# Patient Record
Sex: FEMALE | URBAN - METROPOLITAN AREA
[De-identification: names, ages, dates, MRNs, and addresses within clinical notes are randomized per-mention and may not be internally consistent; named-entity substitution may affect disease eponyms.]

---

## 2021-03-10 ENCOUNTER — OFFICE VISIT (OUTPATIENT)
Dept: URBAN - METROPOLITAN AREA CLINIC 98 | Facility: CLINIC | Age: 51
End: 2021-03-10
Payer: COMMERCIAL

## 2021-03-10 ENCOUNTER — DASHBOARD ENCOUNTERS (OUTPATIENT)
Age: 51
End: 2021-03-10

## 2021-03-10 VITALS
BODY MASS INDEX: 24.8 KG/M2 | SYSTOLIC BLOOD PRESSURE: 107 MMHG | HEIGHT: 62 IN | TEMPERATURE: 96.1 F | DIASTOLIC BLOOD PRESSURE: 75 MMHG | HEART RATE: 81 BPM | WEIGHT: 134.8 LBS

## 2021-03-10 DIAGNOSIS — K58.1 IRRITABLE BOWEL SYNDROME WITH CONSTIPATION: ICD-10-CM

## 2021-03-10 DIAGNOSIS — K59.01 CONSTIPATION BY DELAYED COLONIC TRANSIT: ICD-10-CM

## 2021-03-10 DIAGNOSIS — R14.0 BLOATING: ICD-10-CM

## 2021-03-10 PROBLEM — 440630006: Status: ACTIVE | Noted: 2021-03-10

## 2021-03-10 PROCEDURE — 99204 OFFICE O/P NEW MOD 45 MIN: CPT | Performed by: INTERNAL MEDICINE

## 2021-03-10 NOTE — HPI-OTHER HISTORIES
takes senna daily if no lax, no BM very bloated life long constipation no blood in stool s/p cholecystectomy s/p hysterectomy

## 2021-03-14 LAB
A/G RATIO: 1.7
ALBUMIN: 4.7
ALKALINE PHOSPHATASE: 45
ALT (SGPT): 18
AST (SGOT): 25
BILIRUBIN, TOTAL: 0.4
BUN/CREATININE RATIO: 30
BUN: 23
CALCIUM: 9.9
CARBON DIOXIDE, TOTAL: 26
CHLORIDE: 99
CREATININE: 0.77
DEAMIDATED GLIADIN ABS, IGA: 3
DEAMIDATED GLIADIN ABS, IGG: 2
EGFR IF AFRICN AM: 105
EGFR IF NONAFRICN AM: 91
ENDOMYSIAL ANTIBODY IGA: NEGATIVE
GLOBULIN, TOTAL: 2.8
GLUCOSE: 80
HEMATOCRIT: 44.5
HEMOGLOBIN: 14.3
IMMUNOGLOBULIN A, QN, SERUM: 176
MCH: 28
MCHC: 32.1
MCV: 87
NRBC: (no result)
PLATELETS: 312
POTASSIUM: 4.7
PROTEIN, TOTAL: 7.5
RBC: 5.11
RDW: 13.8
SODIUM: 139
T-TRANSGLUTAMINASE (TTG) IGA: <2
T-TRANSGLUTAMINASE (TTG) IGG: <2
WBC: 6.8

## 2021-03-17 ENCOUNTER — OFFICE VISIT (OUTPATIENT)
Dept: URBAN - METROPOLITAN AREA TELEHEALTH 2 | Facility: TELEHEALTH | Age: 51
End: 2021-03-17
Payer: COMMERCIAL

## 2021-03-17 DIAGNOSIS — K58.1 IRRITABLE BOWEL SYNDROME WITH CONSTIPATION: ICD-10-CM

## 2021-03-17 DIAGNOSIS — K59.01 CONSTIPATION BY DELAYED COLONIC TRANSIT: ICD-10-CM

## 2021-03-17 PROCEDURE — 97802 MEDICAL NUTRITION INDIV IN: CPT | Performed by: DIETITIAN, REGISTERED

## 2021-03-17 NOTE — HPI-TODAY'S VISIT:
Nutrition Visit: Gallbladder removed 22 years ago.  Ht: 62"  wt: 128lbs Works out 6 days per week.  3-4 miles 2-3 times per week.   Lift 6 days per day week.  2 hours at workouts.  Drinks at least 100 ounces water per day.  80 ounces before 10am.  Constipation issue in past 7-8 years.  Treats with senna, etc.  Pt has workout body building  for past 7-8 years.  140g cho, 37g fat 165 grams pro.  Lean body uday salted carmael protein shake,  orgain 2 scoops, collagen 1/2 cup bluebverries 16-20 almonds.  80-85 grams spinach or kale.  Wakes up 5:30-6am  24 ounces water.  Orgain protein shake with water.  Goes to gym.  JHome by 10-10:30.  11am  bone broth lean body shake, another orgain protein shake 1 cup almond milk.  Lunch 4.5 oz lean meat or salmon.  4-5oz kale with berries.  last meal 4.5 oz lean protein 4oz william rice.  maybe another protein shake.  Typical day. Sometimes ritz crackers or cupcakes, etc.  Weekends a lot different.

## 2021-03-22 PROBLEM — 35298007: Status: ACTIVE | Noted: 2021-03-10

## 2021-03-24 ENCOUNTER — OFFICE VISIT (OUTPATIENT)
Dept: URBAN - METROPOLITAN AREA SURGERY CENTER 18 | Facility: SURGERY CENTER | Age: 51
End: 2021-03-24
Payer: COMMERCIAL

## 2021-03-24 DIAGNOSIS — K59.09 CHRONIC CONSTIPATION: ICD-10-CM

## 2021-03-24 PROCEDURE — 45378 DIAGNOSTIC COLONOSCOPY: CPT | Performed by: INTERNAL MEDICINE

## 2021-03-24 PROCEDURE — G8907 PT DOC NO EVENTS ON DISCHARG: HCPCS | Performed by: INTERNAL MEDICINE
